# Patient Record
Sex: MALE | Race: WHITE | NOT HISPANIC OR LATINO | Employment: FULL TIME | URBAN - METROPOLITAN AREA
[De-identification: names, ages, dates, MRNs, and addresses within clinical notes are randomized per-mention and may not be internally consistent; named-entity substitution may affect disease eponyms.]

---

## 2018-02-13 ENCOUNTER — OFFICE VISIT (OUTPATIENT)
Dept: URGENT CARE | Facility: CLINIC | Age: 33
End: 2018-02-13
Payer: COMMERCIAL

## 2018-02-13 VITALS
HEART RATE: 65 BPM | DIASTOLIC BLOOD PRESSURE: 80 MMHG | TEMPERATURE: 97.6 F | SYSTOLIC BLOOD PRESSURE: 130 MMHG | HEIGHT: 69 IN | BODY MASS INDEX: 24.14 KG/M2 | OXYGEN SATURATION: 99 % | WEIGHT: 163 LBS

## 2018-02-13 DIAGNOSIS — S69.91XA INJURY OF RIGHT HAND, INITIAL ENCOUNTER: ICD-10-CM

## 2018-02-13 PROCEDURE — 99203 OFFICE O/P NEW LOW 30 MIN: CPT | Performed by: PHYSICIAN ASSISTANT

## 2018-02-13 ASSESSMENT — ENCOUNTER SYMPTOMS
FEVER: 0
NAUSEA: 0
SHORTNESS OF BREATH: 0
BRUISES/BLEEDS EASILY: 0
TINGLING: 0
CHILLS: 0
VOMITING: 0
SENSORY CHANGE: 0

## 2018-02-13 NOTE — PROGRESS NOTES
"  Subjective:     Ayaan Jovel is a 32 y.o. male who presents for Hand Injury (R knuckle laceration was glued shut,not healing very [id number] ago )       Patient notes he is one week status post punching the ground out of anger which time he did have a laceration to his right fifth MCP. He notes he did wash this extensively repaired it with superglue. This has continued to do well. He is concerned today w/ possible infection. He denies fever/chills or drainage or redness to area. Denies much pain. Notes slight step off w/ skin margins coming together, no other issues. Denies fever/chills or change in sensation, notes full normal strength.     No past medical history on file.No past surgical history on file.  Social History     Social History   • Marital status: Single     Spouse name: N/A   • Number of children: N/A   • Years of education: N/A     Occupational History   • Not on file.     Social History Main Topics   • Smoking status: Former Smoker   • Smokeless tobacco: Never Used   • Alcohol use Not on file   • Drug use: Unknown   • Sexual activity: Not on file     Other Topics Concern   • Not on file     Social History Narrative   • No narrative on file    No family history on file. Review of Systems   Constitutional: Negative for chills and fever.   Respiratory: Negative for shortness of breath.    Gastrointestinal: Negative for nausea and vomiting.   Skin: Negative for rash.        Healing lac to right hand knuckle     Neurological: Negative for tingling and sensory change.   Endo/Heme/Allergies: Does not bruise/bleed easily.   No Known Allergies   Objective:   /80   Pulse 65   Temp 36.4 °C (97.6 °F)   Ht 1.753 m (5' 9\")   Wt 73.9 kg (163 lb)   SpO2 99%   BMI 24.07 kg/m²   Physical Exam   Constitutional: He is oriented to person, place, and time. He appears well-developed and well-nourished. No distress.   HENT:   Head: Normocephalic and atraumatic.   Right Ear: External ear normal.   Left Ear: " External ear normal.   Nose: Nose normal.   Eyes: Conjunctivae are normal. Right eye exhibits no discharge. Left eye exhibits no discharge. No scleral icterus.   Neck: Neck supple.   Pulmonary/Chest: Effort normal. No respiratory distress.   Musculoskeletal: Normal range of motion.        Right hand: He exhibits laceration ( healing well). He exhibits normal range of motion, no tenderness, no bony tenderness, normal two-point discrimination, normal capillary refill, no deformity and no swelling. Normal sensation noted. Normal strength noted.        Hands:  ~1.5-2cm old laceration to right 5th MCP dorsum, no erythema or edema whatsoever, slight step off where skin margins come together, healing by 2nd intention from w/in lac, no concern for infection, full resisted AROM all digits, normal sensation, no rotational abnormalities or MC ttp   Neurological: He is alert and oriented to person, place, and time. Coordination normal.   Skin: Skin is warm and dry. He is not diaphoretic. No pallor.   Psychiatric: He has a normal mood and affect.   Nursing note and vitals reviewed.        Assessment/Plan:   Assessment    1. Injury of right hand, initial encounter  Continue wound care, doing well, no concern for infection  Return to clinic with lack of resolution or progression of symptoms.    Differential diagnosis, natural history, supportive care, and indications for immediate follow-up discussed.

## 2018-03-16 ENCOUNTER — OFFICE VISIT (OUTPATIENT)
Dept: MEDICAL GROUP | Facility: PHYSICIAN GROUP | Age: 33
End: 2018-03-16
Payer: COMMERCIAL

## 2018-03-16 VITALS
HEART RATE: 68 BPM | WEIGHT: 171.96 LBS | DIASTOLIC BLOOD PRESSURE: 76 MMHG | SYSTOLIC BLOOD PRESSURE: 132 MMHG | BODY MASS INDEX: 25.47 KG/M2 | TEMPERATURE: 97.6 F | HEIGHT: 69 IN | RESPIRATION RATE: 16 BRPM | OXYGEN SATURATION: 97 %

## 2018-03-16 DIAGNOSIS — M79.89 SOFT TISSUE MASS: ICD-10-CM

## 2018-03-16 DIAGNOSIS — B37.2 YEAST DERMATITIS: ICD-10-CM

## 2018-03-16 DIAGNOSIS — Z11.3 SCREENING FOR STD (SEXUALLY TRANSMITTED DISEASE): ICD-10-CM

## 2018-03-16 DIAGNOSIS — R21 RASH: ICD-10-CM

## 2018-03-16 DIAGNOSIS — Z00.00 WELLNESS EXAMINATION: ICD-10-CM

## 2018-03-16 PROCEDURE — 99214 OFFICE O/P EST MOD 30 MIN: CPT | Performed by: NURSE PRACTITIONER

## 2018-03-16 RX ORDER — KETOCONAZOLE 20 MG/G
1 CREAM TOPICAL DAILY
Qty: 1 TUBE | Refills: 0 | Status: SHIPPED | OUTPATIENT
Start: 2018-03-16 | End: 2018-06-14

## 2018-03-16 ASSESSMENT — PATIENT HEALTH QUESTIONNAIRE - PHQ9: CLINICAL INTERPRETATION OF PHQ2 SCORE: 0

## 2018-03-16 NOTE — PROGRESS NOTES
"Chief Complaint   Patient presents with   • Establish Care   • Rash     anal area   • Lump     abdomen area\"Every now and again it itches\" pt reports it has increased in size   • Sexually Transmitted Diseases     wants to be tested       HISTORY OF THE PRESENT ILLNESS: This is a 32 y.o. male new patient to our practice. This pleasant patient is here today to discuss rash, and mass.    Rash  Started 3 months ago. States it is red, itchy. No bumps or pustules. No diarrhea, states he feels like his diet is healthier, states higher fiber/protein. No blood or mucous with stool. States he notices itching intermittently. States after they first got together that he would notice a rash, redness after sex. No change in exercise or sweat, no change in soaps or underwear. States he has been eating more sugar than before. States he has not tried anything over-the-counter to make this better or worse.    Soft tissue mass  Patient states that recently he has noticed an increase in size of this lesion. Mass on abdomen noted when he was 16-17 years old, states that it is sometimes itchy. It is 3-4cm from the umbilicus at 9-10 o'clock. Denies redness, erythema, pain to palpation, Fever or chills.      History reviewed. No pertinent past medical history.    Past Surgical History:   Procedure Laterality Date   • TONSILLECTOMY         Family Status   Relation Status   • Mother Alive   • Father Alive     Family History   Problem Relation Age of Onset   • No Known Problems Mother    • Heart Disease Father 50     bacterial infection with stent       Social History   Substance Use Topics   • Smoking status: Former Smoker     Quit date: 1/1/2013   • Smokeless tobacco: Never Used      Comment: vape, quit 2016   • Alcohol use Yes      Comment: occ       Allergies: Patient has no known allergies.    Current Outpatient Prescriptions Ordered in [facility]   Medication Sig Dispense Refill   • ketoconazole (NIZORAL) 2 % Cream Apply 1 Application to " "affected area(s) every day. 1 Tube 0     No current Epic-ordered facility-administered medications on file.        Review of Systems   Constitutional:  Negative for fever, chills, weight loss and malaise/fatigue.   HENT:  Negative for ear pain, nosebleeds, congestion, sore throat and neck pain.    Eyes:  Negative for blurred vision.   Respiratory:  Negative for cough, sputum production, shortness of breath and wheezing.    Cardiovascular:  Negative for chest pain, palpitations, orthopnea and leg swelling.   Gastrointestinal:  Negative for heartburn, nausea, vomiting and abdominal pain.   Genitourinary:  Negative for dysuria, urgency and frequency.   Musculoskeletal:  Negative for myalgias, back pain and joint pain.   Skin: Positive for rash and itching.   Neurological: Negative for dizziness, tingling, tremors, sensory change, focal weakness and headaches.   Endo/Heme/Allergies:  Does not bruise/bleed easily.   Psychiatric/Behavioral:  Negative for depression, anxiety, or memory loss.     All other systems reviewed and are negative except as in HPI.    Exam: Blood pressure 132/76, pulse 68, temperature 36.4 °C (97.6 °F), resp. rate 16, height 1.753 m (5' 9\"), weight 78 kg (171 lb 15.3 oz), SpO2 97 %.  General:  Normal appearing. No distress.  HEENT:  Normocephalic. Eyes conjunctiva clear lids without ptosis, pupils equal and reactive to light accommodation, ears normal shape and contour, canals are clear bilaterally, tympanic membranes are benign, nasal mucosa benign, oropharynx is without erythema, edema or exudates.   Neck:  Supple without JVD or bruit. Thyroid is not enlarged.  Pulmonary:  Clear to ausculation.  Normal effort. No rales, ronchi, or wheezing.  Cardiovascular:  Regular rate and rhythm without murmur. Carotid and radial pulses are intact and equal bilaterally.  Abdomen:  Soft, nontender, nondistended. Normal bowel sounds. Liver and spleen are not palpable  Neurologic:  Grossly nonfocal  Lymph:  No " cervical, supraclavicular or axillary lymph nodes are palpable  Skin:  Warm and dry.  No obvious lesions.  Musculoskeletal:  Normal gait. No extremity cyanosis, clubbing, or edema.  Psych:  Normal mood and affect. Alert and oriented x3. Judgment and insight is normal.  Anus: Noted hemorrhoidal skin tags, area of excoriation with satellite lesions.    PLAN:    1. Rash  5. Yeast dermatitis  - ketoconazole (NIZORAL) 2 % Cream; Apply 1 Application to affected area(s) every day.  Dispense: 1 Tube; Refill: 0  Rashes consistent with yeast dermatitis.  Ketoconazole 2% cream to be applied 1-2 times daily over the next 2 weeks. Counseled regarding risks, benefits, side effects of medication.    2. Soft tissue mass  Patient will complete ultrasound to determine if this is a cyst, lipoma, enlarged lymph node.  - US-ABDOMEN LIMITED; Future    3. Wellness examination  - CBC WITH DIFFERENTIAL; Future  - COMP METABOLIC PANEL; Future  - LIPID PROFILE; Future    4. Screening for STD (sexually transmitted disease)  - HIV AG/AB COMBO ASSAY SCREENING; Future  - T.PALLIDUM AB EIA; Future  - CHLAMYDIA/GC PCR URINE OR SWAB; Future    Follow-up depending on labs or in one year,  as needed. Patient is encouraged to be seen in the emergency room for chest pain, palpitations, shortness of breath, dizziness, severe abdominal pain or other concerning symptoms.      Please note that this dictation was created using voice recognition software. I have made every reasonable attempt to correct obvious errors, but I expect that there are errors of grammar and possibly content that I did not discover before finalizing the note.      Assessment/Plan  1. Rash     2. Soft tissue mass  US-ABDOMEN LIMITED   3. Wellness examination  CBC WITH DIFFERENTIAL    COMP METABOLIC PANEL    LIPID PROFILE   4. Screening for STD (sexually transmitted disease)  HIV AG/AB COMBO ASSAY SCREENING    T.PALLIDUM AB EIA    CHLAMYDIA/GC PCR URINE OR SWAB    CANCELED:  CHLAMYDIA/GC, DNA PROBE W/RFLX   5. Yeast dermatitis  ketoconazole (NIZORAL) 2 % Cream         I have placed the below orders and discussed them with an approved delegating provider. The MA is performing the below orders under the direction of Dr. Turner.

## 2018-03-16 NOTE — ASSESSMENT & PLAN NOTE
Started 3 months ago. States it is red, itchy. No bumps or pustules. No diarrhea, states he feels like his diet is healthier, states higher fiber/protein. No blood or mucous with stool. States he notices itching intermittently. States after they first got together that he would notice a rash, redness after sex. No change in exercise or sweat, no change in soaps or underwear. States he has been eating more sugar than before. States he has not tried anything over-the-counter to make this better or worse.

## 2018-03-16 NOTE — ASSESSMENT & PLAN NOTE
Patient states that recently he has noticed an increase in size of this lesion. Mass on abdomen noted when he was 16-17 years old, states that it is sometimes itchy. It is 3-4cm from the umbilicus at 9-10 o'clock. Denies redness, erythema, pain to palpation, Fever or chills.

## 2018-03-28 ENCOUNTER — HOSPITAL ENCOUNTER (OUTPATIENT)
Dept: RADIOLOGY | Facility: MEDICAL CENTER | Age: 33
End: 2018-03-28
Attending: NURSE PRACTITIONER
Payer: COMMERCIAL

## 2018-03-28 ENCOUNTER — TELEPHONE (OUTPATIENT)
Dept: MEDICAL GROUP | Facility: PHYSICIAN GROUP | Age: 33
End: 2018-03-28

## 2018-03-28 DIAGNOSIS — M79.89 SOFT TISSUE MASS: ICD-10-CM

## 2018-03-28 PROCEDURE — 76705 ECHO EXAM OF ABDOMEN: CPT

## 2018-03-28 NOTE — TELEPHONE ENCOUNTER
Phone Number Called: [phone] (home)     Message: LVM to call back.     Left Message for patient to call back: yes

## 2018-03-28 NOTE — TELEPHONE ENCOUNTER
----- Message from Carmelita Turner M.D. sent at 3/28/2018  4:49 PM PDT -----  Please let patient know that his abdominal ultrasound showed a lipoma. This is a benign collection of fatty tissue and does not need any treatment or surgery. If patient has further questions, I recommend he make an appointment with Peter.  Dr. Turner covering for EMMANUEL Lee

## 2018-03-30 LAB
ALBUMIN SERPL-MCNC: 4.4 G/DL (ref 3.5–5.5)
ALBUMIN/GLOB SERPL: 1.8 {RATIO} (ref 1.2–2.2)
ALP SERPL-CCNC: 47 IU/L (ref 39–117)
ALT SERPL-CCNC: 28 IU/L (ref 0–44)
AST SERPL-CCNC: 25 IU/L (ref 0–40)
BASOPHILS # BLD AUTO: 0 X10E3/UL (ref 0–0.2)
BASOPHILS NFR BLD AUTO: 0 %
BILIRUB SERPL-MCNC: 0.5 MG/DL (ref 0–1.2)
BUN SERPL-MCNC: 21 MG/DL (ref 6–20)
BUN/CREAT SERPL: 21 (ref 9–20)
CALCIUM SERPL-MCNC: 9.2 MG/DL (ref 8.7–10.2)
CHLORIDE SERPL-SCNC: 102 MMOL/L (ref 96–106)
CHOLEST SERPL-MCNC: 179 MG/DL (ref 100–199)
CO2 SERPL-SCNC: 24 MMOL/L (ref 18–29)
CREAT SERPL-MCNC: 1 MG/DL (ref 0.76–1.27)
EOSINOPHIL # BLD AUTO: 0.3 X10E3/UL (ref 0–0.4)
EOSINOPHIL NFR BLD AUTO: 4 %
ERYTHROCYTE [DISTWIDTH] IN BLOOD BY AUTOMATED COUNT: 14.1 % (ref 12.3–15.4)
GFR SERPLBLD CREATININE-BSD FMLA CKD-EPI: 115 ML/MIN/1.73
GFR SERPLBLD CREATININE-BSD FMLA CKD-EPI: 99 ML/MIN/1.73
GLOBULIN SER CALC-MCNC: 2.5 G/DL (ref 1.5–4.5)
GLUCOSE SERPL-MCNC: 97 MG/DL (ref 65–99)
HCT VFR BLD AUTO: 44.7 % (ref 37.5–51)
HDLC SERPL-MCNC: 72 MG/DL
HGB BLD-MCNC: 15.4 G/DL (ref 13–17.7)
HIV 1+2 AB+HIV1 P24 AG SERPL QL IA: NON REACTIVE
IMM GRANULOCYTES # BLD: 0 X10E3/UL (ref 0–0.1)
IMM GRANULOCYTES NFR BLD: 0 %
IMMATURE CELLS  115398: NORMAL
LABORATORY COMMENT REPORT: NORMAL
LDLC SERPL CALC-MCNC: 89 MG/DL (ref 0–99)
LYMPHOCYTES # BLD AUTO: 2.2 X10E3/UL (ref 0.7–3.1)
LYMPHOCYTES NFR BLD AUTO: 34 %
MCH RBC QN AUTO: 29.7 PG (ref 26.6–33)
MCHC RBC AUTO-ENTMCNC: 34.5 G/DL (ref 31.5–35.7)
MCV RBC AUTO: 86 FL (ref 79–97)
MONOCYTES # BLD AUTO: 0.5 X10E3/UL (ref 0.1–0.9)
MONOCYTES NFR BLD AUTO: 8 %
MORPHOLOGY BLD-IMP: NORMAL
NEUTROPHILS # BLD AUTO: 3.4 X10E3/UL (ref 1.4–7)
NEUTROPHILS NFR BLD AUTO: 54 %
NRBC BLD AUTO-RTO: NORMAL %
PLATELET # BLD AUTO: 259 X10E3/UL (ref 150–379)
POTASSIUM SERPL-SCNC: 4.7 MMOL/L (ref 3.5–5.2)
PROT SERPL-MCNC: 6.9 G/DL (ref 6–8.5)
RBC # BLD AUTO: 5.18 X10E6/UL (ref 4.14–5.8)
SODIUM SERPL-SCNC: 142 MMOL/L (ref 134–144)
T PALLIDUM AB SER QL IF: NON REACTIVE
TRIGL SERPL-MCNC: 88 MG/DL (ref 0–149)
VLDLC SERPL CALC-MCNC: 18 MG/DL (ref 5–40)
WBC # BLD AUTO: 6.4 X10E3/UL (ref 3.4–10.8)

## 2018-04-04 ENCOUNTER — TELEPHONE (OUTPATIENT)
Dept: MEDICAL GROUP | Facility: PHYSICIAN GROUP | Age: 33
End: 2018-04-04

## 2018-04-04 NOTE — TELEPHONE ENCOUNTER
----- Message from EMMANUEL Lee sent at 4/3/2018  6:58 PM PDT -----  Please call pt and give lab results: CBC is 100% within normal limits. Electrolytes, liver function, proteins are all within normal limits, BUN is slightly elevated at 21 this is likely representative of dehydration. Cholesterol is 100% within normal limits HIV and syphilis are negative at this time.

## 2018-04-24 ENCOUNTER — HOSPITAL ENCOUNTER (EMERGENCY)
Facility: MEDICAL CENTER | Age: 33
End: 2018-04-24
Attending: EMERGENCY MEDICINE
Payer: COMMERCIAL

## 2018-04-24 VITALS
TEMPERATURE: 98.5 F | HEIGHT: 70 IN | SYSTOLIC BLOOD PRESSURE: 134 MMHG | HEART RATE: 62 BPM | OXYGEN SATURATION: 95 % | RESPIRATION RATE: 18 BRPM | WEIGHT: 165.79 LBS | BODY MASS INDEX: 23.73 KG/M2 | DIASTOLIC BLOOD PRESSURE: 78 MMHG

## 2018-04-24 DIAGNOSIS — S05.01XA ABRASION OF RIGHT CORNEA, INITIAL ENCOUNTER: ICD-10-CM

## 2018-04-24 PROCEDURE — 700101 HCHG RX REV CODE 250

## 2018-04-24 PROCEDURE — 99284 EMERGENCY DEPT VISIT MOD MDM: CPT

## 2018-04-24 RX ORDER — PROPARACAINE HYDROCHLORIDE 5 MG/ML
SOLUTION/ DROPS OPHTHALMIC
Status: COMPLETED
Start: 2018-04-24 | End: 2018-04-24

## 2018-04-24 RX ORDER — PROPARACAINE HYDROCHLORIDE 5 MG/ML
1 SOLUTION/ DROPS OPHTHALMIC ONCE
Status: COMPLETED | OUTPATIENT
Start: 2018-04-24 | End: 2018-04-24

## 2018-04-24 RX ORDER — POLYMYXIN B SULFATE AND TRIMETHOPRIM 1; 10000 MG/ML; [USP'U]/ML
1 SOLUTION OPHTHALMIC 4 TIMES DAILY
Qty: 1 BOTTLE | Refills: 0 | Status: SHIPPED | OUTPATIENT
Start: 2018-04-24 | End: 2018-04-29

## 2018-04-24 RX ORDER — HYDROCODONE BITARTRATE AND ACETAMINOPHEN 5; 325 MG/1; MG/1
1-2 TABLET ORAL EVERY 6 HOURS PRN
Qty: 12 TAB | Refills: 0 | Status: SHIPPED | OUTPATIENT
Start: 2018-04-24 | End: 2018-04-27

## 2018-04-24 RX ADMIN — FLUORESCEIN SODIUM 1 STRIP: 1 STRIP OPHTHALMIC at 22:30

## 2018-04-24 RX ADMIN — PROPARACAINE HYDROCHLORIDE 1 DROP: 5 SOLUTION/ DROPS OPHTHALMIC at 22:30

## 2018-04-24 ASSESSMENT — PAIN SCALES - GENERAL: PAINLEVEL_OUTOF10: 4

## 2018-04-25 NOTE — ED PROVIDER NOTES
"ED Provider Note    Scribed for Vega Alcazar M.D. by Elida Bernal. 4/24/2018, 10:55 PM.    Primary care provider: EMMANUEL Lee  Means of arrival: Walk in  History obtained from: Patient  History limited by: None    CHIEF COMPLAINT  Chief Complaint   Patient presents with   • Eye Injury     Patient was fixing a sprinkler and a stick went into his right eye.        HPI  Ayaan Jovel is a 32 y.o. male who presents to the Emergency Department for evaluation of a right eye injury. He states he was fixing a drip line underneath a bush at 4:30 PM and accidentally stabbed his right eye with a twig. The patient endorses right eye pain and states \"I feel like there is something in my eye.\" He reports he currently wears glasses, but was not wearing glasses at the time of the incidence. He states he has not had any recent surgery to his right eye. The patient claims his tetanus vaccination is not up to date. He is negative for fever. No alleviating or exacerbating factors are identified at this time.     REVIEW OF SYSTEMS  Pertinent positives include right eye trauma and right eye pain. Pertinent negatives include fever.   E.    PAST MEDICAL HISTORY   None noted    SURGICAL HISTORY   has a past surgical history that includes tonsillectomy.    SOCIAL HISTORY  Social History   Substance Use Topics   • Smoking status: Former Smoker     Quit date: 1/1/2013   • Smokeless tobacco: Never Used      Comment: vape, quit 2016   • Alcohol use Yes      Comment: occ      History   Drug Use   • Types: Marijuana     Comment: occ, 2x/month       FAMILY HISTORY  Family History   Problem Relation Age of Onset   • No Known Problems Mother    • Heart Disease Father 50     bacterial infection with stent       CURRENT MEDICATIONS  Home Medications     Reviewed by Brittni Adame R.N. (Registered Nurse) on 04/24/18 at 2035  Med List Status: Complete   Medication Last Dose Status   ketoconazole (NIZORAL) 2 % Cream " "not taking Active                ALLERGIES  No Known Allergies    PHYSICAL EXAM  VITAL SIGNS: /87   Pulse 65   Temp 36.9 °C (98.5 °F)   Resp 18   Ht 1.778 m (5' 10\")   Wt 75.2 kg (165 lb 12.6 oz)   SpO2 95%   BMI 23.79 kg/m²     Pulse ox interpretation: I interpret this pulse ox as normal.  Constitutional: Alert in no apparent distress.  HENT: No rash to face.   Eyes: 3 mm x 4 mm corneal abrasion from 6 o'clock position to 9 o'clock position. No cayla's sign. No foreign body seen. No flare or cell. Right eye pressure found to be 16.     COURSE & MEDICAL DECISION MAKING  Nursing notes, VS, PMSFHx reviewed in chart.    10:55 PM - Patient seen and examined at bedside. He was informed he has a corneal abrasion. Patient will be discharged home with prescription for Polytrim eye drops and Norco 5-325 mg. He was counseled on narcotic use. Patient agreeable to discharge.     Medical Decision Making: Patient appears to have a corneal abrasion. There is no Cayla sign or signs of ruptured globe. There is no foreign body. Patient will be started on Polytrim drops and given narcotics for pain control.     I reviewed prescription monitoring program for patient's narcotic use before prescribing a scheduled drug.The patient will not drink alcohol nor drive with prescribed medications. The patient will return for new or worsening symptoms and is stable at the time of discharge.    The patient is referred to a primary physician for blood pressure management, diabetic screening, and for all other preventative health concerns.    In prescribing controlled substances to this patient, I certify that I have obtained and reviewed the medical history of Ayaan Jovel. I have also made a good branden effort to obtain applicable records from other providers who have treated the patient and records did not demonstrate any increased risk of substance abuse that would prevent me from prescribing controlled substances.     I have " conducted a physical exam and documented it. I have reviewed Mr. Jovel’s prescription history as maintained by the [place] Prescription Monitoring Program.     I have assessed the patient’s risk for abuse, dependency, and addiction using the validated Opioid Risk Tool available at https://www.mdcalc.com/poihlg-qdzo-jesj-ort-narcotic-abuse.     Given the above, I believe the benefits of controlled substance therapy outweigh the risks. The reasons for prescribing controlled substances include non-narcotic, oral analgesic alternatives have been inadequate for pain control. Accordingly, I have discussed the risk and benefits, treatment plan, and alternative therapies with the patient.     DISPOSITION:  Patient will be discharged home in stable condition.    FOLLOW UP:  Sourav Dyer M.D.  [address] [place] [address]  [phone]    Schedule an appointment as soon as possible for a visit in 3 days  If not improving.       OUTPATIENT MEDICATIONS:  Discharge Medication List as of 4/24/2018 11:12 PM      START taking these medications    Details   polymixin-trimethoprim (POLYTRIM) 79687-1.1 UNIT/ML-% Solution Place 1 Drop in right eye 4 times a day for 5 days., Disp-1 Bottle, R-0, Print Rx Paper      HYDROcodone-acetaminophen (NORCO) 5-325 MG Tab per tablet Take 1-2 Tabs by mouth every 6 hours as needed for up to 3 days., Disp-12 Tab, R-0, Print Rx Paper, For 3 days               FINAL IMPRESSION  1. Abrasion of right cornea, initial encounter          Elida JOHNSON), am scribing for, and in the presence of, Vega Alcazar M.D.    Electronically signed by: Elida Torres), 4/24/2018    Vega JOHNSON M.D. personally performed the services described in this documentation, as scribed by Elida Bernal in my presence, and it is both accurate and complete.    The note accurately reflects work and decisions made by me.  Vega Alcazar  4/25/2018  12:10 AM

## 2018-04-25 NOTE — DISCHARGE INSTRUCTIONS
Return if you have drainage from the eye, vision loss, or rash on face.    Corneal Abrasion  The cornea is the clear covering at the front and center of the eye. When you look at the colored portion of the eye, you are looking through the cornea. It is a thin tissue made up of layers. The top layer is the most sensitive layer. A corneal abrasion happens if this layer is scratched or an injury causes it to come off.   HOME CARE  · You may be given drops or a medicated cream. Use the medicine as told by your doctor.  · A pressure patch may be put over the eye. If this is done, follow your doctor's instructions for when to remove the patch. Do not drive or use machines while the eye patch is on. Judging distances is hard to do with a patch on.  · See your doctor for a follow-up exam if you are told to do so. It is very important that you keep this appointment.  GET HELP IF:   · You have pain, are sensitive to light, and have a scratchy feeling in one eye or both eyes.  · Your pressure patch keeps getting loose. You can blink your eye under the patch.  · You have fluid coming from your eye or the lids stick together in the morning.  · You have the same symptoms in the morning that you did with the first abrasion. This could be days, weeks, or months after the first abrasion healed.  This information is not intended to replace advice given to you by your health care provider. Make sure you discuss any questions you have with your health care provider.  Document Released: 06/05/2009 Document Revised: 09/07/2016 Document Reviewed: 08/25/2014  Elsevier Interactive Patient Education © 2017 [employer]

## 2018-04-25 NOTE — ED TRIAGE NOTES
Chief Complaint   Patient presents with   • Eye Injury     Patient was fixing a sprinkler and a stick went into his right eye.        Patient is able to move eye and is able to see but vision is blurry. Patient does not have anything in his eye at this time.

## 2018-06-14 ENCOUNTER — OFFICE VISIT (OUTPATIENT)
Dept: MEDICAL GROUP | Facility: PHYSICIAN GROUP | Age: 33
End: 2018-06-14
Payer: COMMERCIAL

## 2018-06-14 VITALS
TEMPERATURE: 98.7 F | WEIGHT: 173 LBS | HEIGHT: 70 IN | SYSTOLIC BLOOD PRESSURE: 116 MMHG | RESPIRATION RATE: 16 BRPM | BODY MASS INDEX: 24.77 KG/M2 | OXYGEN SATURATION: 98 % | DIASTOLIC BLOOD PRESSURE: 76 MMHG | HEART RATE: 56 BPM

## 2018-06-14 DIAGNOSIS — R21 RASH: ICD-10-CM

## 2018-06-14 DIAGNOSIS — L29.9 ITCHING: ICD-10-CM

## 2018-06-14 PROCEDURE — 99214 OFFICE O/P EST MOD 30 MIN: CPT | Performed by: NURSE PRACTITIONER

## 2018-06-14 RX ORDER — TRIAMCINOLONE ACETONIDE 1 MG/G
1 CREAM TOPICAL 2 TIMES DAILY
Qty: 1 TUBE | Refills: 0 | Status: SHIPPED | OUTPATIENT
Start: 2018-06-14

## 2018-06-14 RX ORDER — HYDROXYZINE HYDROCHLORIDE 25 MG/1
25 TABLET, FILM COATED ORAL 3 TIMES DAILY PRN
Qty: 30 TAB | Refills: 0 | Status: SHIPPED | OUTPATIENT
Start: 2018-06-14

## 2018-06-14 ASSESSMENT — PAIN SCALES - GENERAL: PAINLEVEL: NO PAIN

## 2018-06-14 NOTE — PROGRESS NOTES
Chief Complaint   Patient presents with   • Rash     worse        HISTORY OF PRESENT ILLNESS: Patient is a 32 y.o. male established patient who presents today to discuss rash.    Rash  States that rash has become more red and irritated. States that cream did not help, but didn't make it worse. States he feels like it is just progressing. States there are not pustules. No infection. States it looks like it is healing because it will scab and then the scab will rub off. Has been keeping the area dry. States he has not been having issues with sweat. Does use fragrance free soaps. Switched laundry detergent to arm & hammer free clear. No change in diet, no change in exercise.       Patient Active Problem List    Diagnosis Date Noted   • Rash 03/16/2018   • Soft tissue mass 03/16/2018       Allergies:Patient has no known allergies.    Current Outpatient Prescriptions   Medication Sig Dispense Refill   • triamcinolone acetonide (KENALOG) 0.1 % Cream Apply 1 Application to affected area(s) 2 times a day. 1 Tube 0   • hydrOXYzine HCl (ATARAX) 25 MG Tab Take 1 Tab by mouth 3 times a day as needed for Itching. 30 Tab 0     No current facility-administered medications for this visit.        Social History   Substance Use Topics   • Smoking status: Former Smoker     Quit date: 1/1/2013   • Smokeless tobacco: Never Used      Comment: vape, quit 2016   • Alcohol use Yes      Comment: occ       Family Status   Relation Status   • Mother Alive   • Father Alive     Family History   Problem Relation Age of Onset   • No Known Problems Mother    • Heart Disease Father 50     bacterial infection with stent       Review of Systems:   Constitutional:  Negative for fever, chills, weight loss and malaise/fatigue.    Respiratory:  Negative for cough, sputum production, shortness of breath and wheezing.    Gastrointestinal:  Negative for heartburn, nausea, vomiting and abdominal pain.   Musculoskeletal:  Negative for myalgias, back pain and  "joint pain.   Skin: Positive for rash and itching.   All other systems reviewed and are negative except as in HPI.    Exam:  Blood pressure 116/76, pulse (!) 56, temperature 37.1 °C (98.7 °F), resp. rate 16, height 1.778 m (5' 10\"), weight 78.5 kg (173 lb), SpO2 98 %.  General:  Normal appearing. No distress.  Abdomen:  Soft, nontender, nondistended. Normal bowel sounds. Liver and spleen are not palpable  Neurologic:  Grossly nonfocal  Skin:  Warm and dry.  Erythema, flaky dryness noted in the gluteal cleft, this does look slightly improved from previous visit, but this does extend up, there are hemorrhoid tag noted around the anus.   Musculoskeletal:  Normal gait. No extremity cyanosis, clubbing, or edema.  Psych:  Normal mood and affect. Alert and oriented x3. Judgment and insight is normal.      PLAN:    1. Rash  Plan to try steroid cream, discussed use of barrier cream.  Patient will contact this provider if this does not improve over the next week  - triamcinolone acetonide (KENALOG) 0.1 % Cream; Apply 1 Application to affected area(s) 2 times a day.  Dispense: 1 Tube; Refill: 0    2. Itching  Anal itching, worse after walking.  - hydrOXYzine HCl (ATARAX) 25 MG Tab; Take 1 Tab by mouth 3 times a day as needed for Itching.  Dispense: 30 Tab; Refill: 0    Follow-up as needed. Patient is encouraged to be seen in the emergency room for chest pain, palpitations, shortness of breath, dizziness, severe abdominal pain or other concerning symptoms.    Please note that this dictation was created using voice recognition software. I have made every reasonable attempt to correct obvious errors, but I expect that there are errors of grammar and possibly content that I did not discover before finalizing the note.    Assessment/Plan:  1. Rash  triamcinolone acetonide (KENALOG) 0.1 % Cream   2. Itching  hydrOXYzine HCl (ATARAX) 25 MG Tab          I have placed the below orders and discussed them with an approved delegating " provider. The MA is performing the below orders under the direction of Dr. Turner.

## 2018-06-14 NOTE — ASSESSMENT & PLAN NOTE
States that rash has become more red and irritated. States that cream did not help, but didn't make it worse. States he feels like it is just progressing. States there are not pustules. No infection. States it looks like it is healing because it will scab and then the scab will rub off. Has been keeping the area dry. States he has not been having issues with sweat. Does use fragrance free soaps. Switched laundry detergent to arm & hammer free clear. No change in diet, no change in exercise.

## 2023-11-17 ENCOUNTER — HOSPITAL ENCOUNTER (EMERGENCY)
Facility: MEDICAL CENTER | Age: 38
End: 2023-11-17
Attending: EMERGENCY MEDICINE
Payer: OTHER MISCELLANEOUS

## 2023-11-17 VITALS
OXYGEN SATURATION: 99 % | DIASTOLIC BLOOD PRESSURE: 85 MMHG | BODY MASS INDEX: 24.85 KG/M2 | RESPIRATION RATE: 16 BRPM | HEART RATE: 72 BPM | WEIGHT: 167.77 LBS | SYSTOLIC BLOOD PRESSURE: 146 MMHG | TEMPERATURE: 96.5 F | HEIGHT: 69 IN
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
## 2023-11-17 PROCEDURE — 99283 EMERGENCY DEPT VISIT LOW MDM: CPT

## 2023-11-17 PROCEDURE — 36415 COLL VENOUS BLD VENIPUNCTURE: CPT

## 2023-11-17 PROCEDURE — A9270 NON-COVERED ITEM OR SERVICE: HCPCS | Performed by: EMERGENCY MEDICINE

## 2023-11-17 PROCEDURE — 700102 HCHG RX REV CODE 250 W/ 637 OVERRIDE(OP): Performed by: EMERGENCY MEDICINE

## 2023-11-17 RX ORDER — RALTEGRAVIR 400 MG/1
400 TABLET, FILM COATED ORAL 2 TIMES DAILY
Qty: 56 TABLET | Refills: 0 | Status: ACTIVE | OUTPATIENT
Start: 2023-11-17 | End: 2023-12-15

## 2023-11-17 RX ORDER — EMTRICITABINE AND TENOFOVIR DISOPROXIL FUMARATE 200; 300 MG/1; MG/1
1 TABLET, FILM COATED ORAL ONCE
Status: COMPLETED | OUTPATIENT
Start: 2023-11-17 | End: 2023-11-17

## 2023-11-17 RX ORDER — EMTRICITABINE AND TENOFOVIR DISOPROXIL FUMARATE 200; 300 MG/1; MG/1
1 TABLET, FILM COATED ORAL DAILY
Qty: 28 TABLET | Refills: 0 | Status: ACTIVE | OUTPATIENT
Start: 2023-11-17 | End: 2023-12-15

## 2023-11-17 RX ADMIN — EMTRICITABINE AND TENOFOVIR DISOPROXIL FUMARATE 1 TABLET: 200; 300 TABLET, FILM COATED ORAL at 16:44

## 2023-11-17 RX ADMIN — RALTEGRAVIR 400 MG: 400 TABLET, FILM COATED ORAL at 16:45

## 2023-11-17 NOTE — ED TRIAGE NOTES
"Chief Complaint   Patient presents with    Other     Pt was at work and was accidentally stuck by a needle from an unknown source in R hand      BP (!) 146/85   Pulse 72   Temp 35.8 °C (96.5 °F) (Temporal)   Resp 16   Ht 1.753 m (5' 9\")   Wt 76.1 kg (167 lb 12.3 oz)   SpO2 99%   BMI 24.78 kg/m²     Pt here from work for above cc  Pt is maintenance at an apartment complex, was taking trash out when he sustained needle stick to R hand, unsure where it came from or what is on it  Wants to be evaluated    Pt to lobby, educated on rooming process  "

## 2023-11-17 NOTE — LETTER
"  FORM C-4:  EMPLOYEE’S CLAIM FOR COMPENSATION/ REPORT OF INITIAL TREATMENT  EMPLOYEE’S CLAIM - PROVIDE ALL INFORMATION REQUESTED   First Name Ayaan Last Name Becka Birthdate 1985  Sex male Claim Number   Home Address [address]             [place] [address]                                   Age  38 y.o. 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 (if applicable) installing a     How did this injury or occupational disease occur? Be specific and answer in detail. Use additional sheet if necessary)  I was clearing a space out, under kitchen sink. when moving lines out of the way, I felt a sharp pinch on my thumb.   If you believe that you have an occupational disease, when did you first have knowledge of the disability and it relationship to your employment? n/a Witnesses to the Accident  none   Nature of Injury or Occupational Disease  Workers' Compensation Part(s) of Body Injured or Affected  Thumb (R), N/A, N/A    I CERTIFY THAT THE ABOVE IS TRUE AND CORRECT TO THE BEST OF MY KNOWLEDGE AND THAT I HAVE PROVIDED THIS INFORMATION IN ORDER TO OBTAIN THE BENEFITS OF [place]  I HEREBY AUTHORIZE ANY PHYSICIAN, CHIROPRACTOR, SURGEON, PRACTITIONER, OR OTHER PERSON, ANY HOSPITAL, INCLUDING [facility], ANY MEDICAL SERVICE ORGANIZATION, ANY INSURANCE COMPANY, OR OTHER INSTITUTION OR ORGANIZATION TO RELEASE TO EACH OTHER, ANY MEDICAL OR OTHER INFORMATION, INCLUDING BENEFITS PAID OR PAYABLE, PERTINENT TO THIS INJURY OR DISEASE, EXCEPT INFORMATION RELATIVE TO DIAGNOSIS, TREATMENT AND/OR COUNSELING FOR AIDS, PSYCHOLOGICAL CONDITIONS, ALCOHOL OR CONTROLLED SUBSTANCES, FOR WHICH I MUST GIVE SPECIFIC AUTHORIZATION.  A PHOTOSTAT OF THIS AUTHORIZATION SHALL BE AS VALID AS THE ORIGINAL.  Date 11/17/23             Place     [facility]                          Employee’s Signature   THIS REPORT MUST BE COMPLETED AND MAILED WITHIN 3 WORKING DAYS OF TREATMENT   Place [facility], EMERGENCY DEPT                       Name of Facility [facility]   Date  11/17/2023 Diagnosis  (W46.0XXA) Accident caused by hypodermic needle, initial encounter Is there evidence the injured employee was under the influence of alcohol and/or another controlled substance at the time of accident?   Hour  4:18 PM Description of Injury or Disease  Accident caused by hypodermic needle, initial encounter No   Treatment  Medical screening, testing, PEP treatment  Have you advised the patient to remain off work five days or more?         No   X-Ray Findings    If Yes   From Date    To Date      From information given by the employee, together with medical evidence, can you directly connect this injury or occupational disease as job incurred? Yes If No, is employee capable of: Full Duty  Yes Modified Duty      Is additional medical care by a physician indicated? Yes  Comments:ongoing viral testing If Modified Duty, Specify any Limitations / Restrictions       Do you know of any previous injury or disease  "contributing to this condition or occupational disease? No    Date 11/17/2023 Print Doctor’s Name Robert Olmos I certify the employer’s copy of this form was mailed on:   Address [address] [place] [address]  [place] [address] [phone] INSURER’S USE ONLY   Provider’s Tax ID Number   Telephone Dept: [phone]    Doctor’s Signature marlon-ROBERT Cleveland M.D., MD      Form C-4 (rev.10/07)                                                                         BRIEF DESCRIPTION OF RIGHTS AND BENEFITS  (Pursuant to [place] 616[place])    Notice of Injury or Occupational Disease (Incident Report Form C-1): If an injury or occupational disease (OD) arises out of and in the course of employment, you must provide written notice to your employer as soon as practicable, but no later than 7 days after the accident or OD. Your employer shall maintain a sufficient supply of the required forms.    Claim for Compensation (Form C-4): If medical treatment is sought, the form C-4 is available at the place of initial treatment. A completed \"Claim for Compensation\" (Form C-4) must be filed within 90 days after an accident or OD. The treating physician or chiropractor must, within 3 working days after treatment, complete and mail to the employer, the employer's insurer and third-party , the Claim for Compensation.    Medical Treatment: If you require medical treatment for your on-the-job injury or OD, you may be required to select a physician or chiropractor from a list provided by your workers’ compensation insurer, if it has contracted with an Organization for Managed Care (MCO) or Preferred Provider Organization (PPO) or providers of health care. If your employer has not entered into a contract with an MCO or PPO, you may select a physician or chiropractor from the Panel of Physicians and Chiropractors. Any medical costs related to your industrial injury or OD will be paid by your insurer.    Temporary Total Disability " (TTD): If your doctor has certified that you are unable to work for a period of at least 5 consecutive days, or 5 cumulative days in a 20-day period, or places restrictions on you that your employer does not accommodate, you may be entitled to TTD compensation.    Temporary Partial Disability (TPD): If the wage you receive upon reemployment is less than the compensation for TTD to which you are entitled, the insurer may be required to pay you TPD compensation to make up the difference. TPD can only be paid for a maximum of 24 months.    Permanent Partial Disability (PPD): When your medical condition is stable and there is an indication of a PPD as a result of your injury or OD, within 30 days, your insurer must arrange for an evaluation by a rating physician or chiropractor to determine the degree of your PPD. The amount of your PPD award depends on the date of injury, the results of the PPD evaluation, your age and wage.    Permanent Total Disability (PTD): If you are medically certified by a treating physician or chiropractor as permanently and totally disabled and have been granted a PTD status by your insurer, you are entitled to receive monthly benefits not to exceed 66 2/3% of your average monthly wage. The amount of your PTD payments is subject to reduction if you previously received a lump-sum PPD award.    Vocational Rehabilitation Services: You may be eligible for vocational rehabilitation services if you are unable to return to the job due to a permanent physical impairment or permanent restrictions as a result of your injury or occupational disease.    Transportation and Per [place] Reimbursement: You may be eligible for travel expenses and per jordan associated with medical treatment.    Reopening: You may be able to reopen your claim if your condition worsens after claim closure.     Appeal Process: If you disagree with a written determination issued by the insurer or the insurer does not respond to your  request, you may appeal to the Department of Administration, , by following the instructions contained in your determination letter. You must appeal the determination within 70 days from the date of the determination letter at [address], [address], [place], [place] [address], or [address], [address], [place], [place] [address]. If you disagree with the  decision, you may appeal to the Department of Administration, . You must file your appeal within 30 days from the date of the  decision letter at [address], [address], [place], [place] [address], or [address], [address], [place], [place] [address]. If you disagree with a decision of an , you may file a petition for judicial review with the District Court. You must do so within 30 days of the Appeal Officer’s decision. You may be represented by an  at your own expense or you may contact the [facility] for possible representation.    [place]  for Injured Workers (NAIW): If you disagree with a  decision, you may request that NAIW represent you without charge at an  Hearing. For information regarding denial of benefits, you may contact the [facility] at: [address], [address], [place] [address], [phone], or [address], [address], [place] [address], [phone]    To File a Complaint with the Division: If you wish to file a complaint with the  of the Division of Industrial Relations (DIR),  please contact the Workers’ Compensation Section, [address], [address], [place], [place] [address], telephone [phone]) [phone], or [address], [address], [place], [place] [address], telephone [phone].    For assistance with Workers’ Compensation Issues: You may contact the [facility] Office for Consumer Health Assistance, [address], [address], [place]  [place] [address], Toll Free 1-362.906.2969, Web site: http://[facility].[place].gov/Programs/LUZ E-mail: luz@[facility].nv.gov  D-2 (rev. 10/20)              __________________________________________________________________                                    _________________            Employee Name / Signature                                                                                                                            Date

## 2023-11-18 NOTE — ED NOTES
The patient has been provided with discharge education and information.  The patient was also provided with instructions on follow up care and return precautions.  The patient verbalizes understanding of discharge instructions, follow up care, and return precautions.  All questions have been answered.  Truvada, raltegravir RX prescribed by NEW.  NAD, A/Dejah, good color and appropriate at time of discharge.  Patient ambulated out of department.

## 2023-11-18 NOTE — ED PROVIDER NOTES
ED Provider Note    CHIEF COMPLAINT  Chief Complaint   Patient presents with    Other     Pt was at work and was accidentally stuck by a needle from an unknown source in R hand        EXTERNAL RECORDS REVIEWED  Inpatient Notes remote ER note 2019 visit for GERD at [facility]    HPI/ROS  LIMITATION TO HISTORY   Select: : None  OUTSIDE HISTORIAN(S):  None    Ayaan Jovel is a 38 y.o. male who presents to the emergency department after accidental needlestick.  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  While exchanging the  he he felt a poke and then had what appears to be an insulin syringe needle stuck in his right thumb.  Source patient therefore unknown.  He denies any personal history of infectious disease processes.  Not currently on any medications.    PAST MEDICAL HISTORY       SURGICAL HISTORY   has a past surgical history that includes tonsillectomy.    FAMILY HISTORY  Family History   Problem Relation Age of Onset    No Known Problems Mother     Heart Disease Father 50        bacterial infection with stent       SOCIAL HISTORY  Social History     Tobacco Use    Smoking status: Former     Current packs/day: 0.00     Types: Cigarettes     Quit date: 1/1/2013     Years since quitting: 10.8    Smokeless tobacco: Never    Tobacco comments:     vape, quit 2016   Substance and Sexual Activity    Alcohol use: Yes     Comment: occ    Drug use: Yes     Types: Marijuana     Comment: occ, 2x/month    Sexual activity: Yes     Partners: Female     Birth control/protection: Condom     Comment: long term       CURRENT MEDICATIONS  Home Medications       Reviewed by Aarti Richmond R.N. (Registered Nurse) on 11/17/23 at 1538  Med List Status: Partial     Medication Last Dose Status   hydrOXYzine HCl (ATARAX) 25 MG Tab  Active   triamcinolone acetonide (KENALOG) 0.1 % Cream  Active                    ALLERGIES  No Known Allergies    PHYSICAL  "EXAM  VITAL SIGNS: BP (!) 146/85   Pulse 72   Temp 35.8 °C (96.5 °F) (Temporal)   Resp 16   Ht 1.753 m (5' 9\")   Wt 76.1 kg (167 lb 12.3 oz)   SpO2 99%   BMI 24.78 kg/m²      Pulse ox interpretation: I interpret this pulse ox as normal.  Constitutional: Alert in no apparent distress.  HENT: No signs of trauma, Bilateral external ears normal, Nose normal.   Thorax & Lungs:  No respiratory distress  Skin: Warm, Dry  Extremities: Right hand: Punctate wound over the ulnar aspect of the right thumb PIP  Neurologic: Alert , Normal motor function, Normal sensory function, No focal deficits noted.   Psychiatric: Affect normal, Judgment normal, Mood normal.         COURSE & MEDICAL DECISION MAKING    ED Observation Status? No; Patient does not meet criteria for ED Observation.     INITIAL ASSESSMENT, COURSE AND PLAN  Care Narrative: 38-year-old male presenting to the emergency department with the above presentation.  Will complete viral testing and start patient on PEP    DISPOSITION AND DISCUSSIONS  I have discussed management of the patient with the following physicians and ANDRE's: None    Discussion of management with other QHP or appropriate source(s): None     Escalation of care considered, and ultimately not performed:diagnostic imaging and acute inpatient care management, however at this time, the patient is most appropriate for outpatient management    Barriers to care at this time, including but not limited to: None    38-year-old male presenting to the emergency department with above presentation.  Care plan as above.  Patient is having return precautions and has been referred to outpatient occupational health facility for reevaluation ongoing care for    FINAL DIAGNOSIS  1. Accident caused by hypodermic needle, initial encounter           Electronically signed by: Robert Olmos M.D., 11/17/2023 4:10 PM      "